# Patient Record
Sex: FEMALE | Race: WHITE | NOT HISPANIC OR LATINO
[De-identification: names, ages, dates, MRNs, and addresses within clinical notes are randomized per-mention and may not be internally consistent; named-entity substitution may affect disease eponyms.]

---
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
## 2021-01-16 ENCOUNTER — NON-APPOINTMENT (OUTPATIENT)
Age: 21
End: 2021-01-16

## 2021-01-19 ENCOUNTER — APPOINTMENT (OUTPATIENT)
Dept: ENDOCRINOLOGY | Facility: CLINIC | Age: 21
End: 2021-01-19
Payer: COMMERCIAL

## 2021-01-19 VITALS
BODY MASS INDEX: 23.78 KG/M2 | OXYGEN SATURATION: 96 % | WEIGHT: 148 LBS | HEART RATE: 74 BPM | DIASTOLIC BLOOD PRESSURE: 72 MMHG | HEIGHT: 66 IN | TEMPERATURE: 98.1 F | SYSTOLIC BLOOD PRESSURE: 114 MMHG

## 2021-01-19 DIAGNOSIS — L68.0 HIRSUTISM: ICD-10-CM

## 2021-01-19 DIAGNOSIS — E06.9 THYROIDITIS, UNSPECIFIED: ICD-10-CM

## 2021-01-19 PROCEDURE — 99214 OFFICE O/P EST MOD 30 MIN: CPT

## 2021-01-19 PROCEDURE — 99072 ADDL SUPL MATRL&STAF TM PHE: CPT

## 2021-01-19 RX ORDER — ESCITALOPRAM OXALATE 5 MG/1
5 TABLET ORAL DAILY
Refills: 0 | Status: DISCONTINUED | COMMUNITY
Start: 2020-06-23 | End: 2021-01-19

## 2021-01-19 RX ORDER — PROPRANOLOL HYDROCHLORIDE 10 MG/1
10 TABLET ORAL 3 TIMES DAILY
Qty: 90 | Refills: 3 | Status: ACTIVE | COMMUNITY
Start: 2021-01-19 | End: 1900-01-01

## 2021-01-19 NOTE — REVIEW OF SYSTEMS
[Fatigue] : fatigue [Palpitations] : palpitations [Constipation] : constipation [Dry Skin] : dry skin [Dizziness] : dizziness [Anxiety] : anxiety [Heat Intolerance] : heat intolerance [Negative] : Respiratory [Recent Weight Gain (___ Lbs)] : recent weight gain: [unfilled] lbs [Recent Weight Loss (___ Lbs)] : no recent weight loss [Polyuria] : no polyuria [Irregular Menses] : regular menses [Hair Loss] : no hair loss [Headaches] : no headaches [Tremors] : no tremors [Polydipsia] : no polydipsia [Easy Bruising] : no tendency for easy bruising [Swelling] : no swelling

## 2021-01-19 NOTE — ASSESSMENT
[FreeTextEntry1] : 20 y.o. female with h/o irregular menses with hirsutism, and thyroiditis.\par \par 1. Irregular menses with hirsutism- Given h/o hirsutism, discussed differential diagnosis including PCOS and nonclassic CAH. Had negative screen for nonclassic CAH with a normal 17 OHP level. Reviewed pathophysiology of PCOS and also discussed insulin resistance. Had normal fasting plasma glucose with insulin level and HBa1c. Had normal testosterone and DHEAS level. Agree with OCPs. Discussed option of Spironolactone for hirsutism. Encouraged carbohydrate consistent diet and exercise.\par \par 2. Thyroiditis- Discussed pathophysiology and etiology of toxic nodule vs autoimmune. Unlikely viral induced given lack of neck tenderness and length of duration. No evidence of autoimmune etiology now given normal antithyroid antibodies, TSI and TSH receptor antibody. Had [place] thyroid ultrasound in 9/2020. Given borderline low TSH with normal Free T4, T4 and T3, will monitor for now. Recommend Propranolol 10 mg TID as needed. \par \par \par \par Follow up in 6 months

## 2021-01-19 NOTE — DATA REVIEWED
[FreeTextEntry1] : Labs\par 1/6/2021\par TSH receptor Ab <1.1\par TSI <0.1\par glucose 86\par BUN/cr 6/0.84\par calcium 9.4\par TPO ab <15\par Tg ab <15\par TSH 0.452 Free T4 1.55 T3 160 T4 13.2\par CBC normal

## 2021-01-19 NOTE — PHYSICAL EXAM
[Alert] : alert [No Acute Distress] : no acute distress [Normal Sclera/Conjunctiva] : normal sclera/conjunctiva [EOMI] : extra ocular movement intact [No LAD] : no lymphadenopathy [Thyroid Not Enlarged] : the thyroid was not enlarged [No Thyroid Nodules] : no palpable thyroid nodules [No Respiratory Distress] : no respiratory distress [Clear to Auscultation] : lungs were clear to auscultation bilaterally [Normal S1, S2] : normal S1 and S2 [Regular Rhythm] : with a regular rhythm [No Edema] : no peripheral edema [Normal Bowel Sounds] : normal bowel sounds [Not Tender] : non-tender [Not Distended] : not distended [Soft] : abdomen soft [Normal Anterior Cervical Nodes] : no anterior cervical lymphadenopathy [No Clubbing, Cyanosis] : no clubbing  or cyanosis of the fingernails [No Rash] : no rash [Normal Reflexes] : deep tendon reflexes were 2+ and symmetric [Normal Affect] : the affect was normal [Normal Mood] : the mood was normal [Abdominal Striae] : no abdominal striae [Acanthosis Nigricans] : no acanthosis nigricans [Acne] : no acne [Hirsutism] : no hirsutism [No Tremors] : no tremors

## 2021-01-19 NOTE — HISTORY OF PRESENT ILLNESS
[FreeTextEntry1] : 20 y.o. female with h/o thyroiditis diagnosed in 2019 presents for follow up visit. Reports c/o hair loss originally and saw pediatric endocrinology who did not diagnosis a thyroid condition. Then saw adult endocrinology and diagnosed in 2019 with thyroiditis. Has been being monitored. Did have EGD and celiac disease was ruled out. Does report fluctuating symptoms for both hyper and hypo thyroidism. Also reports hirsutism along chin and abdomen. No acne now. Does follow with GYN and had normal transvaginal sonogram in 12/2019. Reports regular menses with OCPs. Started OCPs at age 17. Tried Depo once and developed cystic acne. Reports weight is up but exercising less. No neck complaints including dysphagia, dysphonia or neck pain. Reports 2 weeks ago developed high anxiety, poor sleep and fatigue. Reports palpitations. No tremor. Now symptoms are better. \par \par Thyroid ultrasound in 9/2020 was normal.\par \par Has paternal uncle with Graves disease. Dad has celiac disease and 2 siblings also have celiac disease. \par \par \par Saw psychiatry for anxiety at college. Off Lexapro now.

## 2021-02-19 ENCOUNTER — RX RENEWAL (OUTPATIENT)
Age: 21
End: 2021-02-19

## 2021-03-15 ENCOUNTER — APPOINTMENT (OUTPATIENT)
Dept: OBGYN | Facility: CLINIC | Age: 21
End: 2021-03-15
Payer: COMMERCIAL

## 2021-03-15 VITALS
WEIGHT: 148 LBS | BODY MASS INDEX: 23.78 KG/M2 | HEIGHT: 66 IN | DIASTOLIC BLOOD PRESSURE: 70 MMHG | SYSTOLIC BLOOD PRESSURE: 110 MMHG

## 2021-03-15 DIAGNOSIS — N92.6 IRREGULAR MENSTRUATION, UNSPECIFIED: ICD-10-CM

## 2021-03-15 PROCEDURE — 99395 PREV VISIT EST AGE 18-39: CPT

## 2021-03-15 PROCEDURE — 99072 ADDL SUPL MATRL&STAF TM PHE: CPT

## 2021-03-15 PROCEDURE — 99213 OFFICE O/P EST LOW 20 MIN: CPT | Mod: 25

## 2021-03-15 RX ORDER — DESOGESTREL AND ETHINYL ESTRADIOL 0.15-0.03
0.15-3 KIT ORAL
Qty: 1 | Refills: 2 | Status: ACTIVE | COMMUNITY
Start: 2020-04-07 | End: 1900-01-01

## 2021-03-15 NOTE — DISCUSSION/SUMMARY
[FreeTextEntry1] : GC and Chlamydia cultures are performed. After counseling the patient she consents for HIV hepatitis B C. and RPR testing.\par \par Re: birth control I had an extensive discussion. I discussed ocular migraines being a relative contraindication to taking oral contraceptives. I extensively discussed IUD use. Patient has multiple questions and is hesitant to try the IUD. I again reiterated that options are limited in controlling her periods if she cannot be on oral contraceptives and cannot tolerate Depo-Provera. Patient accepts the risks and wants me to prescribe another few months of oral contraceptives so she can research the  IUD and consider it.

## 2021-03-15 NOTE — HISTORY OF PRESENT ILLNESS
[FreeTextEntry1] : 20-year-old white female G0 here for annual visit and to discuss birth control options. Patient has been on oral contraceptives for a history of irregular menses. She tried Depo-Provera but got cystic acne and did not wish to pursue that. Patient is not happy taking oral contraceptives and she feels that they cause mood lability. She has some history of depression/anxiety and was on Lexapro but again stopped that secondary to intolerance of the; she is now taking propanolol for anxiety.\par \par She is medical history depression/anxiety; ocular migraines. No surgical history. She is single. She denies alcohol tobacco drug use.\par \par She is sexually active and monogamous and uses condoms. She has had the gardasil vaccine.\par \par Patient is a sophomore at [employer].

## 2021-07-06 ENCOUNTER — APPOINTMENT (OUTPATIENT)
Dept: ENDOCRINOLOGY | Facility: CLINIC | Age: 21
End: 2021-07-06

## 2022-06-03 ENCOUNTER — APPOINTMENT (OUTPATIENT)
Dept: OBGYN | Facility: CLINIC | Age: 22
End: 2022-06-03
Payer: COMMERCIAL

## 2022-06-03 VITALS
SYSTOLIC BLOOD PRESSURE: 112 MMHG | WEIGHT: 152 LBS | BODY MASS INDEX: 24.43 KG/M2 | DIASTOLIC BLOOD PRESSURE: 78 MMHG | HEIGHT: 66 IN

## 2022-06-03 DIAGNOSIS — Z30.09 ENCOUNTER FOR OTHER GENERAL COUNSELING AND ADVICE ON CONTRACEPTION: ICD-10-CM

## 2022-06-03 PROCEDURE — 99395 PREV VISIT EST AGE 18-39: CPT

## 2022-06-03 PROCEDURE — 99213 OFFICE O/P EST LOW 20 MIN: CPT | Mod: 25

## 2022-06-03 NOTE — HISTORY OF PRESENT ILLNESS
[FreeTextEntry1] : 21-year-old white female G0 here for annual visit..  She is sexually active and is on oral contraceptives for a history of irregular menses.  She also says that she uses condoms.  She tried Depo-Provera but got cystic acne and did not wish to pursue that. Patient is not happy taking oral contraceptives and she feels that they cause mood lability. She has some history of depression/anxiety and was on Lexapro but again stopped that secondary to intolerance of the; she is now taking propanolol for anxiety.  She also had some history of ocular migraines and I had encouraged her to consider an IUD rather than oral contraceptives but she says her symptoms have essentially resolved and she wishes to continue the pill.\par \par She is medical history depression/anxiety; ocular migraines. No surgical history. She is single. She denies alcohol tobacco drug use.\par \par She is sexually active and monogamous and uses condoms. She has had the gardasil vaccine.\par \par Patient is now  at [employer] studying psychology.  She spent about 6 months in [place] this year..

## 2022-06-03 NOTE — DISCUSSION/SUMMARY
[FreeTextEntry1] : GC and Chlamydia cultures are performed. She declines hiv testing.\par \par I again discussed her birth control options and I encouraged her to consider an IUD for greater efficacy as well as because of her history of ocular migraines.  However as mentioned patient now feels that her symptoms have resolved and wishes to continue on the pill.  She takes the pill primarily for her periods but also for birth control.

## 2022-06-06 DIAGNOSIS — A74.9 CHLAMYDIAL INFECTION, UNSPECIFIED: ICD-10-CM

## 2022-06-06 LAB
C TRACH RRNA SPEC QL NAA+PROBE: DETECTED
N GONORRHOEA RRNA SPEC QL NAA+PROBE: NOT DETECTED
SOURCE AMPLIFICATION: NORMAL

## 2022-06-06 RX ORDER — AZITHROMYCIN 500 MG/1
500 TABLET, FILM COATED ORAL
Qty: 2 | Refills: 1 | Status: ACTIVE | COMMUNITY
Start: 2022-06-06 | End: 1900-01-01

## 2023-06-08 ENCOUNTER — APPOINTMENT (OUTPATIENT)
Dept: OBGYN | Facility: CLINIC | Age: 23
End: 2023-06-08
Payer: COMMERCIAL

## 2023-06-08 VITALS
HEIGHT: 66 IN | WEIGHT: 157 LBS | BODY MASS INDEX: 25.23 KG/M2 | DIASTOLIC BLOOD PRESSURE: 62 MMHG | SYSTOLIC BLOOD PRESSURE: 114 MMHG

## 2023-06-08 DIAGNOSIS — Z01.419 ENCOUNTER FOR GYNECOLOGICAL EXAMINATION (GENERAL) (ROUTINE) W/OUT ABNORMAL FINDINGS: ICD-10-CM

## 2023-06-08 PROCEDURE — 99395 PREV VISIT EST AGE 18-39: CPT

## 2023-06-08 RX ORDER — DESOGESTREL AND ETHINYL ESTRADIOL 0.15-0.03
0.15-3 KIT ORAL
Qty: 3 | Refills: 3 | Status: ACTIVE | COMMUNITY
Start: 2021-06-22 | End: 1900-01-01

## 2023-06-08 NOTE — DISCUSSION/SUMMARY
[FreeTextEntry1] : pap, GC and Chlamydia cultures are performed. She declines hiv testing.\par \par I again discussed her birth control options and I encouraged her to consider an IUD for greater efficacy as well as because of her history of ocular migraines.  However as mentioned patient now feels that her symptoms have resolved and wishes to continue on the pill.  She takes the pill primarily for her periods but also for birth control.  Oral contraceptives are refilled

## 2023-06-08 NOTE — HISTORY OF PRESENT ILLNESS
[FreeTextEntry1] : 22-year-old white female G0 here for annual visit..  She is sexually active and is on oral contraceptives for a history of irregular menses.  She also says that she uses condoms.  She tried Depo-Provera but got cystic acne and did not wish to pursue that. Patient is not happy taking oral contraceptives and she feels that they cause mood lability. She has some history of depression/anxiety and was on Lexapro but again stopped that secondary to intolerance of the; she is now taking propanolol prn for anxiety.  She also had some history of ocular migraines and I had encouraged her to consider an IUD rather than oral contraceptives but she says her symptoms have essentially resolved and she wishes to continue the pill.\par \par She is medical history depression/anxiety; ocular migraines. No surgical history. She is single. She denies alcohol tobacco drug use.\par \par She is sexually active and monogamous and uses condoms. She has had the gardasil vaccine.\par \par Patient iust graduated from [employer] studying psychology.  She is working as an .

## 2023-06-11 LAB
C TRACH RRNA SPEC QL NAA+PROBE: NOT DETECTED
N GONORRHOEA RRNA SPEC QL NAA+PROBE: NOT DETECTED
SOURCE TP AMPLIFICATION: NORMAL

## 2023-06-14 ENCOUNTER — LABORATORY RESULT (OUTPATIENT)
Age: 23
End: 2023-06-14

## 2023-06-14 LAB — CYTOLOGY CVX/VAG DOC THIN PREP: ABNORMAL

## 2023-06-28 ENCOUNTER — APPOINTMENT (OUTPATIENT)
Dept: OBGYN | Facility: CLINIC | Age: 23
End: 2023-06-28
Payer: COMMERCIAL

## 2023-06-28 VITALS
HEIGHT: 66 IN | BODY MASS INDEX: 25.23 KG/M2 | WEIGHT: 157 LBS | SYSTOLIC BLOOD PRESSURE: 110 MMHG | DIASTOLIC BLOOD PRESSURE: 70 MMHG

## 2023-06-28 PROCEDURE — 81025 URINE PREGNANCY TEST: CPT

## 2023-06-28 PROCEDURE — 57454 BX/CURETT OF CERVIX W/SCOPE: CPT

## 2023-07-10 LAB — CORE LAB BIOPSY: NORMAL

## 2023-07-19 ENCOUNTER — APPOINTMENT (OUTPATIENT)
Dept: OBGYN | Facility: CLINIC | Age: 23
End: 2023-07-19
Payer: COMMERCIAL

## 2023-07-19 VITALS
HEIGHT: 66 IN | SYSTOLIC BLOOD PRESSURE: 120 MMHG | BODY MASS INDEX: 25.23 KG/M2 | WEIGHT: 157 LBS | DIASTOLIC BLOOD PRESSURE: 70 MMHG

## 2023-07-19 DIAGNOSIS — R87.612 LOW GRADE SQUAMOUS INTRAEPITHELIAL LESION ON CYTOLOGIC SMEAR OF CERVIX (LGSIL): ICD-10-CM

## 2023-07-19 PROCEDURE — 99213 OFFICE O/P EST LOW 20 MIN: CPT

## 2023-07-19 NOTE — HISTORY OF PRESENT ILLNESS
[FreeTextEntry1] : 22-year-old white female  0 here for follow-up visit.  Patient had low-grade JOSSIE Pap with negative colposcopy.  She has had Gardasil.  She presents today to discuss further management options.

## 2023-07-19 NOTE — DISCUSSION/SUMMARY
[FreeTextEntry1] : I discussed etiology and management of low-grade JOSSIE at length with the patient.  I discussed extremely high likelihood of spontaneous resolution in a young patient and low likelihood of progression with need for further therapy.  We discussed repeating a Pap in 1 year.  Today after discussing risks and benefits TCA was applied to transformation zone and endocervical canal.

## 2024-09-13 DIAGNOSIS — Z11.3 ENCOUNTER FOR SCREENING FOR INFECTIONS WITH A PREDOMINANTLY SEXUAL MODE OF TRANSMISSION: ICD-10-CM

## 2024-09-13 DIAGNOSIS — Z01.419 ENCOUNTER FOR GYNECOLOGICAL EXAMINATION (GENERAL) (ROUTINE) W/OUT ABNORMAL FINDINGS: ICD-10-CM

## 2024-09-16 ENCOUNTER — APPOINTMENT (OUTPATIENT)
Dept: OBGYN | Facility: CLINIC | Age: 24
End: 2024-09-16

## 2024-09-17 ENCOUNTER — APPOINTMENT (OUTPATIENT)
Dept: ENDOCRINOLOGY | Facility: CLINIC | Age: 24
End: 2024-09-17
Payer: COMMERCIAL

## 2024-09-17 VITALS
BODY MASS INDEX: 24.75 KG/M2 | HEIGHT: 66 IN | SYSTOLIC BLOOD PRESSURE: 110 MMHG | WEIGHT: 154 LBS | DIASTOLIC BLOOD PRESSURE: 60 MMHG

## 2024-09-17 DIAGNOSIS — E06.9 THYROIDITIS, UNSPECIFIED: ICD-10-CM

## 2024-09-17 DIAGNOSIS — R53.83 OTHER FATIGUE: ICD-10-CM

## 2024-09-17 DIAGNOSIS — L68.0 HIRSUTISM: ICD-10-CM

## 2024-09-17 DIAGNOSIS — Z78.9 OTHER SPECIFIED HEALTH STATUS: ICD-10-CM

## 2024-09-17 PROCEDURE — 99204 OFFICE O/P NEW MOD 45 MIN: CPT

## 2024-09-17 NOTE — REVIEW OF SYSTEMS
[Fatigue] : fatigue [Palpitations] : palpitations [Dry Skin] : dry skin [Hirsutism] : hirsutism [Hair Loss] : hair loss [Headaches] : headaches [Dizziness] : dizziness [Anxiety] : anxiety [Negative] : Musculoskeletal [Recent Weight Gain (___ Lbs)] : no recent weight gain [Recent Weight Loss (___ Lbs)] : no recent weight loss [Polyuria] : no polyuria [Irregular Menses] : regular menses [Tremors] : no tremors [Polydipsia] : no polydipsia [Cold Intolerance] : no cold intolerance [Heat Intolerance] : no heat intolerance [Easy Bruising] : no tendency for easy bruising [Swelling] : no swelling [FreeTextEntry3] : decrease in vision [de-identified] : brain fog, headaches prior to menses

## 2024-09-17 NOTE — HISTORY OF PRESENT ILLNESS
[FreeTextEntry1] : 24 y.o. female with h/o thyroiditis diagnosed in 2019 presents for evaluation of thyroid disease and hirsutism.   She was last seen in our office in January 2021.    Recent history: OCPs stopped in October 2023, and she reports regular menstrual cycles since off OCPs.  Reports high normal testosterone level in August 2024. No acne Scalp is dry and itchy - she is going to see dermatology Increase in chin hair and abdomen- does pluck and wax every week Increase in hair loss Weight is stable- less gym time and reports loss of muscle mass  Sees GYN. Did have pelvic ultrasound with no ovarian cysts in 12/2019.  No neck complaints including dysphagia, dysphonia or neck pain.   Thyroid ultrasound in 9/2020 was normal.  Has paternal uncle with Graves' disease. Dad has celiac disease, and 2 siblings also have celiac disease.  Saw psychiatry for anxiety when she was in college. Off Lexapro now.    [employer] in 2023 Works at various places like restaurant and .   Prior history: Reports c/o hair loss originally and saw pediatric endocrinology who did not diagnosis a thyroid condition. Then saw adult endocrinology and diagnosed in 2019 with thyroiditis. She was being monitored. Did have EGD and celiac disease was ruled out. Did report fluctuating symptoms for both hyper and hypothyroidism. Also reports hirsutism along chin and abdomen. No acne. Does follow with GYN and had normal transvaginal sonogram in 12/2019. Reports h/o regular menses with OCPs. Started OCPs at age 17. Tried Depo once and developed cystic acne.

## 2024-09-17 NOTE — REVIEW OF SYSTEMS
[Fatigue] : fatigue [Palpitations] : palpitations [Dry Skin] : dry skin [Hirsutism] : hirsutism [Hair Loss] : hair loss [Headaches] : headaches [Dizziness] : dizziness [Anxiety] : anxiety [Negative] : Musculoskeletal [Recent Weight Gain (___ Lbs)] : no recent weight gain [Recent Weight Loss (___ Lbs)] : no recent weight loss [Polyuria] : no polyuria [Irregular Menses] : regular menses [Tremors] : no tremors [Polydipsia] : no polydipsia [Cold Intolerance] : no cold intolerance [Heat Intolerance] : no heat intolerance [Easy Bruising] : no tendency for easy bruising [Swelling] : no swelling [FreeTextEntry3] : decrease in vision [de-identified] : brain fog, headaches prior to menses

## 2024-09-17 NOTE — CONSULT LETTER
[Dear  ___] : Dear  [unfilled], [Consult Letter:] : I had the pleasure of evaluating your patient, [unfilled]. [( Thank you for referring [unfilled] for consultation for _____ )] : Thank you for referring [unfilled] for consultation for [unfilled] [Please see my note below.] : Please see my note below. [Consult Closing:] : Thank you very much for allowing me to participate in the care of this patient.  If you have any questions, please do not hesitate to contact me. [Sincerely,] : Sincerely, [FreeTextEntry2] : Wyatt Romero MD [address] [place] [address] [FreeTextEntry3] : Josue Carr DO  of Medicine [employer] [employer] [facility]

## 2024-09-17 NOTE — DATA REVIEWED
[FreeTextEntry1] : Labs 8/1/24 TSH 0.684 Free T3 3.17 Free T4 1.56 estradiol 131 LH 12.4 FSH 4.5 testosterone 45 H/H 13.4/41.1 glucose 66 BUN/cr 9/.78 calcium 9.4 chol 165 HDL 74 LDL 74 tri 83 25 vitamin D 40.4 Hba1c 4.9%      1/6/2021 TSH receptor Ab <1.1 TSI <0.1 glucose 86 BUN/cr 6/0.84 calcium 9.4 TPO ab <15 Tg ab <15 TSH 0.452 Free T4 1.55 T3 160 T4 13.2 CBC normal

## 2024-09-17 NOTE — PHYSICAL EXAM
[Alert] : alert [No Acute Distress] : no acute distress [Normal Sclera/Conjunctiva] : normal sclera/conjunctiva [EOMI] : extra ocular movement intact [No LAD] : no lymphadenopathy [Thyroid Not Enlarged] : the thyroid was not enlarged [No Thyroid Nodules] : no palpable thyroid nodules [No Respiratory Distress] : no respiratory distress [Clear to Auscultation] : lungs were clear to auscultation bilaterally [Normal S1, S2] : normal S1 and S2 [Regular Rhythm] : with a regular rhythm [No Edema] : no peripheral edema [Normal Bowel Sounds] : normal bowel sounds [Not Tender] : non-tender [Not Distended] : not distended [Soft] : abdomen soft [Normal Anterior Cervical Nodes] : no anterior cervical lymphadenopathy [No Clubbing, Cyanosis] : no clubbing  or cyanosis of the fingernails [Normal Reflexes] : deep tendon reflexes were 2+ and symmetric [No Tremors] : no tremors [Normal Affect] : the affect was normal [Normal Mood] : the mood was normal [No Stigmata of Cushings Syndrome] : no stigmata of Cushings Syndrome [Abdominal Striae] : no abdominal striae [Acanthosis Nigricans] : no acanthosis nigricans [Acne] : no acne [Hirsutism] : no hirsutism

## 2024-09-17 NOTE — HISTORY OF PRESENT ILLNESS
[FreeTextEntry1] : 24 y.o. female with h/o thyroiditis diagnosed in 2019 presents for evaluation of thyroid disease and hirsutism.   She was last seen in our office in January 2021.    Recent history: OCPs stopped in October 2023, and she reports regular menstrual cycles since off OCPs.  Reports high normal testosterone level in August 2024. No acne Scalp is dry and itchy - she is going to see dermatology Increase in chin hair and abdomen- does pluck and wax every week Increase in hair loss Weight is stable- less gym time and reports loss of muscle mass  Sees GYN. Did have pelvic ultrasound with no ovarian cysts in 12/2019.  No neck complaints including dysphagia, dysphonia or neck pain.   Thyroid ultrasound in 9/2020 was normal.  Has paternal uncle with Graves' disease. Dad has celiac disease, and 2 siblings also have celiac disease.  Saw psychiatry for anxiety when she was in college. Off Lexapro now.    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   Prior history: Reports c/o hair loss originally and saw pediatric endocrinology who did not diagnosis a thyroid condition. Then saw adult endocrinology and diagnosed in 2019 with thyroiditis. She was being monitored. Did have EGD and celiac disease was ruled out. Did report fluctuating symptoms for both hyper and hypothyroidism. Also reports hirsutism along chin and abdomen. No acne. Does follow with GYN and had normal transvaginal sonogram in 12/2019. Reports h/o regular menses with OCPs. Started OCPs at age 17. Tried Depo once and developed cystic acne.

## 2024-09-17 NOTE — ASSESSMENT
[FreeTextEntry1] : 24 y.o. female with h/o hirsutism, thyroiditis and fatigue.  1. Hirsutism- Given h/o hirsutism, discussed differential diagnosis including PCOS and nonclassic CAH. Had negative screen for nonclassic CAH with a normal 17 OHP level in the past. Reviewed pathophysiology of PCOS and also discussed insulin resistance. Had normal fasting plasma glucose with insulin level and HBa1c in the past. Had normal testosterone and DHEAS level in the past. Can consider restarting OCPs if needed. Discussed option of Spironolactone for hirsutism. Reviewed risks and benefits including hyperkalemia and hypotension. Encouraged carbohydrate consistent diet and exercise. Will check LH/FSH, estradiol, prolactin, DHEAS, testosterone and 17 OHP levels. Will check midnight salivary cortisol to screen for Cushings.   2. Thyroiditis- Discussed pathophysiology and etiology of toxic nodule vs autoimmune. Unlikely viral induced given lack of neck tenderness and length of duration. No evidence of autoimmune etiology in the past given normal antithyroid antibodies, TSI and TSH receptor antibody. Had normal thyroid ultrasound in 9/2020. Given borderline low TSH with normal Free T4, T4 and T3, will monitor for now. Recommend Propranolol 10 mg TID as needed. Will repeat TFTs with antithyroid antibodies, TSI and TSH receptor ab.  3. Fatigue- Will check TFTs. Had normal CBC.     Follow up pending results

## 2024-09-24 ENCOUNTER — LABORATORY RESULT (OUTPATIENT)
Age: 24
End: 2024-09-24

## 2024-09-24 LAB
DHEA-S SERPL-MCNC: 224 UG/DL
ESTRADIOL SERPL-MCNC: 96 PG/ML
FSH SERPL-MCNC: 4.8 IU/L
HCG SERPL-MCNC: <1 MIU/ML
LH SERPL-ACNC: 12.7 IU/L
PROLACTIN SERPL-MCNC: 114 NG/ML
T3 SERPL-MCNC: 111 NG/DL
T3RU NFR SERPL: <0.2 TBI
T4 FREE SERPL-MCNC: 1.5 NG/DL
T4 SERPL-MCNC: 7.5 UG/DL
TESTOST SERPL-MCNC: 50.2 NG/DL
THYROGLOB AB SERPL-ACNC: 18.1 IU/ML
THYROPEROXIDASE AB SERPL IA-ACNC: 17.7 IU/ML
TSH RECEPTOR AB: <1.1 IU/L
TSH SERPL-ACNC: 0.62 UIU/ML

## 2024-09-26 LAB — TSI ACT/NOR SER: <0.1 IU/L

## 2024-09-28 LAB
17OHP SERPL-MCNC: 66 NG/DL
CORTIS SAL-MCNC: NORMAL

## 2024-09-30 DIAGNOSIS — E22.1 HYPERPROLACTINEMIA: ICD-10-CM

## 2024-10-07 ENCOUNTER — TRANSCRIPTION ENCOUNTER (OUTPATIENT)
Age: 24
End: 2024-10-07

## 2024-10-07 ENCOUNTER — APPOINTMENT (OUTPATIENT)
Age: 24
End: 2024-10-07
Payer: COMMERCIAL

## 2024-10-07 VITALS
SYSTOLIC BLOOD PRESSURE: 116 MMHG | BODY MASS INDEX: 24.91 KG/M2 | WEIGHT: 155 LBS | DIASTOLIC BLOOD PRESSURE: 78 MMHG | HEIGHT: 66 IN

## 2024-10-07 DIAGNOSIS — B00.9 HERPESVIRAL INFECTION, UNSPECIFIED: ICD-10-CM

## 2024-10-07 LAB
ALBUMIN SERPL ELPH-MCNC: 4.5 G/DL
ALP BLD-CCNC: 58 U/L
ALT SERPL-CCNC: 8 U/L
ANION GAP SERPL CALC-SCNC: 14 MMOL/L
AST SERPL-CCNC: 17 U/L
BILIRUB SERPL-MCNC: 0.6 MG/DL
BUN SERPL-MCNC: 10 MG/DL
CALCIUM SERPL-MCNC: 9.5 MG/DL
CHLORIDE SERPL-SCNC: 102 MMOL/L
CO2 SERPL-SCNC: 22 MMOL/L
CREAT SERPL-MCNC: 0.96 MG/DL
EGFR: 85 ML/MIN/1.73M2
GH SERPL-MCNC: 0.67 NG/ML
GLUCOSE SERPL-MCNC: 87 MG/DL
POTASSIUM SERPL-SCNC: 4.2 MMOL/L
PROLACTIN SERPL-MCNC: 70.6 NG/ML
PROT SERPL-MCNC: 7 G/DL
SODIUM SERPL-SCNC: 138 MMOL/L

## 2024-10-07 PROCEDURE — 99214 OFFICE O/P EST MOD 30 MIN: CPT

## 2024-10-07 RX ORDER — LIDOCAINE 5 G/100G
5 OINTMENT TOPICAL
Qty: 1 | Refills: 0 | Status: ACTIVE | COMMUNITY
Start: 2024-10-07 | End: 1900-01-01

## 2024-10-08 LAB
HSV 1+2 IGG SER IA-IMP: NEGATIVE
HSV 1+2 IGG SER IA-IMP: POSITIVE
HSV1 IGG SER QL: 5.14 INDEX
HSV2 IGG SER QL: 0.04 INDEX
IGF-1 INTERP: NORMAL
IGF-I BLD-MCNC: 288 NG/ML

## 2024-10-29 RX ORDER — VALACYCLOVIR 500 MG/1
500 TABLET, FILM COATED ORAL
Qty: 14 | Refills: 3 | Status: ACTIVE | COMMUNITY
Start: 2024-10-29 | End: 1900-01-01

## 2024-10-30 ENCOUNTER — TRANSCRIPTION ENCOUNTER (OUTPATIENT)
Age: 24
End: 2024-10-30

## 2024-10-30 ENCOUNTER — APPOINTMENT (OUTPATIENT)
Dept: MRI IMAGING | Facility: CLINIC | Age: 24
End: 2024-10-30
Payer: COMMERCIAL

## 2024-10-30 PROCEDURE — A9585: CPT

## 2024-10-30 PROCEDURE — 70553 MRI BRAIN STEM W/O & W/DYE: CPT

## 2024-11-04 ENCOUNTER — APPOINTMENT (OUTPATIENT)
Age: 24
End: 2024-11-04
Payer: COMMERCIAL

## 2024-11-04 VITALS
HEART RATE: 59 BPM | SYSTOLIC BLOOD PRESSURE: 109 MMHG | WEIGHT: 155 LBS | DIASTOLIC BLOOD PRESSURE: 72 MMHG | BODY MASS INDEX: 24.91 KG/M2 | HEIGHT: 66 IN

## 2024-11-04 DIAGNOSIS — Z30.09 ENCOUNTER FOR OTHER GENERAL COUNSELING AND ADVICE ON CONTRACEPTION: ICD-10-CM

## 2024-11-04 DIAGNOSIS — B37.9 CANDIDIASIS, UNSPECIFIED: ICD-10-CM

## 2024-11-04 DIAGNOSIS — Z01.419 ENCOUNTER FOR GYNECOLOGICAL EXAMINATION (GENERAL) (ROUTINE) W/OUT ABNORMAL FINDINGS: ICD-10-CM

## 2024-11-04 PROCEDURE — 99459 PELVIC EXAMINATION: CPT

## 2024-11-04 PROCEDURE — 99214 OFFICE O/P EST MOD 30 MIN: CPT | Mod: 25

## 2024-11-04 PROCEDURE — 99395 PREV VISIT EST AGE 18-39: CPT

## 2024-11-04 RX ORDER — CLOTRIMAZOLE AND BETAMETHASONE DIPROPIONATE 10; .5 MG/G; MG/G
1-0.05 CREAM TOPICAL TWICE DAILY
Qty: 1 | Refills: 2 | Status: ACTIVE | COMMUNITY
Start: 2024-11-04 | End: 1900-01-01

## 2024-11-11 ENCOUNTER — APPOINTMENT (OUTPATIENT)
Dept: OBGYN | Facility: CLINIC | Age: 24
End: 2024-11-11

## 2024-11-11 LAB — CYTOLOGY CVX/VAG DOC THIN PREP: NORMAL

## 2024-11-12 ENCOUNTER — NON-APPOINTMENT (OUTPATIENT)
Age: 24
End: 2024-11-12

## 2025-03-10 ENCOUNTER — TRANSCRIPTION ENCOUNTER (OUTPATIENT)
Age: 25
End: 2025-03-10

## 2025-03-17 ENCOUNTER — APPOINTMENT (OUTPATIENT)
Age: 25
End: 2025-03-17
Payer: COMMERCIAL

## 2025-03-17 DIAGNOSIS — Z30.09 ENCOUNTER FOR OTHER GENERAL COUNSELING AND ADVICE ON CONTRACEPTION: ICD-10-CM

## 2025-03-17 PROCEDURE — 99213 OFFICE O/P EST LOW 20 MIN: CPT | Mod: 95

## 2025-04-15 ENCOUNTER — NON-APPOINTMENT (OUTPATIENT)
Age: 25
End: 2025-04-15

## 2025-04-16 ENCOUNTER — APPOINTMENT (OUTPATIENT)
Dept: ANTEPARTUM | Facility: CLINIC | Age: 25
End: 2025-04-16
Payer: COMMERCIAL

## 2025-04-16 ENCOUNTER — APPOINTMENT (OUTPATIENT)
Age: 25
End: 2025-04-16
Payer: COMMERCIAL

## 2025-04-16 ENCOUNTER — ASOB RESULT (OUTPATIENT)
Age: 25
End: 2025-04-16

## 2025-04-16 VITALS
SYSTOLIC BLOOD PRESSURE: 109 MMHG | DIASTOLIC BLOOD PRESSURE: 72 MMHG | BODY MASS INDEX: 25.71 KG/M2 | HEIGHT: 66 IN | WEIGHT: 160 LBS

## 2025-04-16 DIAGNOSIS — Z30.09 ENCOUNTER FOR OTHER GENERAL COUNSELING AND ADVICE ON CONTRACEPTION: ICD-10-CM

## 2025-04-16 PROCEDURE — 76856 US EXAM PELVIC COMPLETE: CPT | Mod: 59

## 2025-04-16 PROCEDURE — 99213 OFFICE O/P EST LOW 20 MIN: CPT

## 2025-04-16 PROCEDURE — 76830 TRANSVAGINAL US NON-OB: CPT

## 2025-04-28 ENCOUNTER — RX RENEWAL (OUTPATIENT)
Age: 25
End: 2025-04-28

## 2025-05-05 ENCOUNTER — APPOINTMENT (OUTPATIENT)
Age: 25
End: 2025-05-05

## 2025-07-14 ENCOUNTER — TRANSCRIPTION ENCOUNTER (OUTPATIENT)
Age: 25
End: 2025-07-14